# Patient Record
Sex: FEMALE | Race: WHITE | HISPANIC OR LATINO | Employment: FULL TIME | URBAN - METROPOLITAN AREA
[De-identification: names, ages, dates, MRNs, and addresses within clinical notes are randomized per-mention and may not be internally consistent; named-entity substitution may affect disease eponyms.]

---

## 2023-03-07 ENCOUNTER — TELEPHONE (OUTPATIENT)
Dept: PRIMARY CARE | Facility: CLINIC | Age: 38
End: 2023-03-07
Payer: COMMERCIAL

## 2023-03-08 DIAGNOSIS — F90.0 ATTENTION DEFICIT HYPERACTIVITY DISORDER (ADHD), PREDOMINANTLY INATTENTIVE TYPE: Primary | ICD-10-CM

## 2023-03-08 RX ORDER — DEXTROAMPHETAMINE SACCHARATE, AMPHETAMINE ASPARTATE, DEXTROAMPHETAMINE SULFATE AND AMPHETAMINE SULFATE 5; 5; 5; 5 MG/1; MG/1; MG/1; MG/1
20 TABLET ORAL 3 TIMES DAILY
Qty: 90 TABLET | Refills: 0 | Status: SHIPPED | OUTPATIENT
Start: 2023-03-08 | End: 2023-04-11 | Stop reason: SDUPTHER

## 2023-04-11 DIAGNOSIS — F90.0 ATTENTION DEFICIT HYPERACTIVITY DISORDER (ADHD), PREDOMINANTLY INATTENTIVE TYPE: ICD-10-CM

## 2023-04-11 RX ORDER — DEXTROAMPHETAMINE SACCHARATE, AMPHETAMINE ASPARTATE, DEXTROAMPHETAMINE SULFATE AND AMPHETAMINE SULFATE 5; 5; 5; 5 MG/1; MG/1; MG/1; MG/1
20 TABLET ORAL 3 TIMES DAILY
Qty: 90 TABLET | Refills: 0 | Status: SHIPPED | OUTPATIENT
Start: 2023-04-11 | End: 2023-05-11 | Stop reason: SDUPTHER

## 2023-04-21 PROBLEM — R05.9 COUGH: Status: ACTIVE | Noted: 2023-04-21

## 2023-04-21 PROBLEM — R39.9 UTI SYMPTOMS: Status: ACTIVE | Noted: 2023-04-21

## 2023-04-21 PROBLEM — M54.50 LUMBAR PAIN: Status: ACTIVE | Noted: 2023-04-21

## 2023-04-21 PROBLEM — F90.9 ADHD (ATTENTION DEFICIT HYPERACTIVITY DISORDER): Status: ACTIVE | Noted: 2023-04-21

## 2023-04-21 RX ORDER — IBUPROFEN 600 MG/1
600 TABLET ORAL EVERY 6 HOURS
COMMUNITY
Start: 2023-01-01

## 2023-04-21 RX ORDER — ACETAMINOPHEN 325 MG/1
325 TABLET ORAL EVERY 6 HOURS
COMMUNITY
Start: 2023-01-01

## 2023-04-21 RX ORDER — NORETHINDRONE 0.35 MG/1
1 TABLET ORAL DAILY
COMMUNITY
Start: 2023-01-26

## 2023-04-21 RX ORDER — FLUTICASONE PROPIONATE 50 MCG
2 SPRAY, SUSPENSION (ML) NASAL DAILY
COMMUNITY
Start: 2022-05-31 | End: 2023-06-08 | Stop reason: SDUPTHER

## 2023-04-21 RX ORDER — METAXALONE 800 MG/1
800 TABLET ORAL NIGHTLY
COMMUNITY
Start: 2019-10-31 | End: 2023-04-24 | Stop reason: SDUPTHER

## 2023-04-24 ENCOUNTER — OFFICE VISIT (OUTPATIENT)
Dept: PRIMARY CARE | Facility: CLINIC | Age: 38
End: 2023-04-24
Payer: COMMERCIAL

## 2023-04-24 VITALS
BODY MASS INDEX: 31.08 KG/M2 | HEART RATE: 65 BPM | HEIGHT: 67 IN | OXYGEN SATURATION: 95 % | DIASTOLIC BLOOD PRESSURE: 78 MMHG | WEIGHT: 198 LBS | SYSTOLIC BLOOD PRESSURE: 130 MMHG | TEMPERATURE: 97.2 F

## 2023-04-24 DIAGNOSIS — M54.50 LUMBAR PAIN: ICD-10-CM

## 2023-04-24 DIAGNOSIS — F90.0 ATTENTION DEFICIT HYPERACTIVITY DISORDER (ADHD), PREDOMINANTLY INATTENTIVE TYPE: ICD-10-CM

## 2023-04-24 DIAGNOSIS — Z00.00 WELLNESS EXAMINATION: Primary | ICD-10-CM

## 2023-04-24 PROCEDURE — 99213 OFFICE O/P EST LOW 20 MIN: CPT | Performed by: FAMILY MEDICINE

## 2023-04-24 PROCEDURE — 1036F TOBACCO NON-USER: CPT | Performed by: FAMILY MEDICINE

## 2023-04-24 PROCEDURE — 99395 PREV VISIT EST AGE 18-39: CPT | Performed by: FAMILY MEDICINE

## 2023-04-24 RX ORDER — METAXALONE 800 MG/1
800 TABLET ORAL NIGHTLY
Qty: 90 TABLET | Refills: 1 | Status: SHIPPED | OUTPATIENT
Start: 2023-04-24

## 2023-04-24 ASSESSMENT — ENCOUNTER SYMPTOMS
LOSS OF SENSATION IN FEET: 0
OCCASIONAL FEELINGS OF UNSTEADINESS: 0
DEPRESSION: 0

## 2023-04-24 ASSESSMENT — PATIENT HEALTH QUESTIONNAIRE - PHQ9
1. LITTLE INTEREST OR PLEASURE IN DOING THINGS: NOT AT ALL
1. LITTLE INTEREST OR PLEASURE IN DOING THINGS: NOT AT ALL
SUM OF ALL RESPONSES TO PHQ9 QUESTIONS 1 AND 2: 0
2. FEELING DOWN, DEPRESSED OR HOPELESS: NOT AT ALL
SUM OF ALL RESPONSES TO PHQ9 QUESTIONS 1 AND 2: 0
2. FEELING DOWN, DEPRESSED OR HOPELESS: NOT AT ALL

## 2023-04-24 NOTE — PROGRESS NOTES
OARRS:  Maria G Persaud, DO on 4/24/2023  9:44 AM  I have personally reviewed the OARRS report for Ashley Gibson. I have considered the risks of abuse, dependence, addiction and diversion    Is the patient prescribed a combination of a benzodiazepine and opioid?  No    Last Urine Drug Screen / ordered today: No  Recent Results (from the past 40371 hour(s))   Amphetamine Confirm, Urine    Collection Time: 01/30/23 12:00 AM   Result Value Ref Range    Amphetamines,Urine <50 ng/mL    MDA, Urine <200 ng/mL    MDEA, Urine <200 ng/mL    MDMA, Urine <200 ng/mL    Methamphetamine Quant, Ur <200 ng/mL    Phentermine,Urine <200 ng/mL   Drug Screen, Urine With Reflex to Confirmation    Collection Time: 01/30/23 12:00 AM   Result Value Ref Range    DRUG SCREEN COMMENT URINE SEE BELOW     Amphetamine Screen, Urine PRESUMPTIVE NEGATIVE NEGATIVE    Barbiturate Screen, Urine PRESUMPTIVE NEGATIVE NEGATIVE    BENZODIAZEPINE (PRESENCE) IN URINE BY SCREEN METHOD PRESUMPTIVE NEGATIVE NEGATIVE    Cannabinoid Screen, Urine PRESUMPTIVE NEGATIVE NEGATIVE    Cocaine Screen, Urine PRESUMPTIVE NEGATIVE NEGATIVE    Fentanyl, Ur PRESUMPTIVE NEGATIVE NEGATIVE    Methadone Screen, Urine PRESUMPTIVE NEGATIVE NEGATIVE    Opiate Screen, Urine PRESUMPTIVE NEGATIVE NEGATIVE    Oxycodone Screen, Ur PRESUMPTIVE NEGATIVE NEGATIVE    PCP Screen, Urine PRESUMPTIVE NEGATIVE NEGATIVE     Results are as expected.     Controlled Substance Agreement:  Date of the Last Agreement: 1-30-23  Reviewed Controlled Substance Agreement including but not limited to the benefits, risks, and alternatives to treatment with a Controlled Substance medication(s).    Stimulants:   What is the patient's goal of therapy? add  Is this being achieved with current treatment? yes    Activities of Daily Living:   Is your overall impression that this patient is benefiting (symptom reduction outweighs side effects) from stimulant therapy? Yes     1. Physical Functioning: Same  2.  "Family Relationship: Same  3. Social Relationship: Same  4. Mood: Same  5. Sleep Patterns: Same  6. Overall Function: Same  Subjective   Patient ID: Ashley Gibson is a 37 y.o. female who presents for Annual Exam (YEARLY PHYSICAL).    HPI     Review of Systems   All other systems reviewed and are negative.      Objective   /78   Pulse 65   Temp 36.2 °C (97.2 °F)   Ht 1.702 m (5' 7\")   Wt 89.8 kg (198 lb)   SpO2 95%   BMI 31.01 kg/m²     Physical Exam  Constitutional:       Appearance: Normal appearance.   HENT:      Head: Normocephalic and atraumatic.      Right Ear: Tympanic membrane, ear canal and external ear normal.      Left Ear: Tympanic membrane, ear canal and external ear normal.      Nose: Nose normal.      Mouth/Throat:      Mouth: Mucous membranes are moist.      Pharynx: Oropharynx is clear.   Eyes:      Extraocular Movements: Extraocular movements intact.      Conjunctiva/sclera: Conjunctivae normal.      Pupils: Pupils are equal, round, and reactive to light.   Cardiovascular:      Rate and Rhythm: Normal rate and regular rhythm.      Pulses: Normal pulses.      Heart sounds: Normal heart sounds.   Pulmonary:      Effort: Pulmonary effort is normal.      Breath sounds: Normal breath sounds.   Abdominal:      General: Abdomen is flat. Bowel sounds are normal.      Palpations: Abdomen is soft.   Musculoskeletal:         General: Normal range of motion.      Cervical back: Normal range of motion and neck supple.   Skin:     General: Skin is warm and dry.      Capillary Refill: Capillary refill takes less than 2 seconds.   Neurological:      General: No focal deficit present.      Mental Status: She is alert and oriented to person, place, and time.   Psychiatric:         Mood and Affect: Mood normal.         Behavior: Behavior normal.         Thought Content: Thought content normal.     Assessment/Plan   Diagnoses and all orders for this visit:  Wellness examination  Lumbar pain  -     " metaxalone (Skelaxin) 800 mg tablet; Take 1 tablet (800 mg) by mouth once daily at bedtime.  Attention deficit hyperactivity disorder (ADHD), predominantly inattentive type

## 2023-05-11 DIAGNOSIS — F90.0 ATTENTION DEFICIT HYPERACTIVITY DISORDER (ADHD), PREDOMINANTLY INATTENTIVE TYPE: ICD-10-CM

## 2023-05-11 RX ORDER — DEXTROAMPHETAMINE SACCHARATE, AMPHETAMINE ASPARTATE, DEXTROAMPHETAMINE SULFATE AND AMPHETAMINE SULFATE 5; 5; 5; 5 MG/1; MG/1; MG/1; MG/1
20 TABLET ORAL 3 TIMES DAILY
Qty: 90 TABLET | Refills: 0 | Status: SHIPPED | OUTPATIENT
Start: 2023-05-11 | End: 2023-06-08 | Stop reason: SDUPTHER

## 2023-05-11 NOTE — TELEPHONE ENCOUNTER
PT CALLED REQUESTING A REFILL OF HER ADDRALL TO [facility] IN [place]. HER LAST APPT WAS 4.24.23 AND NEXT IS 7.24.23

## 2023-06-08 DIAGNOSIS — R05.2 SUBACUTE COUGH: Primary | ICD-10-CM

## 2023-06-08 DIAGNOSIS — F90.0 ATTENTION DEFICIT HYPERACTIVITY DISORDER (ADHD), PREDOMINANTLY INATTENTIVE TYPE: ICD-10-CM

## 2023-06-08 RX ORDER — DEXTROAMPHETAMINE SACCHARATE, AMPHETAMINE ASPARTATE, DEXTROAMPHETAMINE SULFATE AND AMPHETAMINE SULFATE 5; 5; 5; 5 MG/1; MG/1; MG/1; MG/1
20 TABLET ORAL 3 TIMES DAILY
Qty: 90 TABLET | Refills: 0 | Status: SHIPPED | OUTPATIENT
Start: 2023-06-08 | End: 2023-07-12 | Stop reason: SDUPTHER

## 2023-06-08 RX ORDER — FLUTICASONE PROPIONATE 50 MCG
2 SPRAY, SUSPENSION (ML) NASAL DAILY
Qty: 16 G | Refills: 0 | Status: SHIPPED | OUTPATIENT
Start: 2023-06-08 | End: 2023-07-12 | Stop reason: SDUPTHER

## 2023-06-08 NOTE — TELEPHONE ENCOUNTER
PT CALLED REQUESTING A REFILL OF HER ADDERALL AND FLONASE TO THE [facility] IN [place] PLEASE. LAST APPT WAS 4.24.23 AND NEXIT IS 7.24.23

## 2023-07-12 DIAGNOSIS — F90.0 ATTENTION DEFICIT HYPERACTIVITY DISORDER (ADHD), PREDOMINANTLY INATTENTIVE TYPE: ICD-10-CM

## 2023-07-12 DIAGNOSIS — R05.2 SUBACUTE COUGH: ICD-10-CM

## 2023-07-12 RX ORDER — DEXTROAMPHETAMINE SACCHARATE, AMPHETAMINE ASPARTATE, DEXTROAMPHETAMINE SULFATE AND AMPHETAMINE SULFATE 5; 5; 5; 5 MG/1; MG/1; MG/1; MG/1
20 TABLET ORAL 3 TIMES DAILY
Qty: 90 TABLET | Refills: 0 | Status: SHIPPED | OUTPATIENT
Start: 2023-07-12 | End: 2023-08-10 | Stop reason: SDUPTHER

## 2023-07-12 RX ORDER — FLUTICASONE PROPIONATE 50 MCG
2 SPRAY, SUSPENSION (ML) NASAL DAILY
Qty: 16 G | Refills: 0 | Status: SHIPPED | OUTPATIENT
Start: 2023-07-12

## 2023-07-12 NOTE — TELEPHONE ENCOUNTER
PT REQUESTING A REFILL OF HER ADDERALL AND FLONASE TO THE [facility] IN [place]. LAST APPT WAS 4.24.23 AND NEXT IS 7.24.23

## 2023-07-24 ENCOUNTER — TELEMEDICINE (OUTPATIENT)
Dept: PRIMARY CARE | Facility: CLINIC | Age: 38
End: 2023-07-24
Payer: COMMERCIAL

## 2023-07-24 DIAGNOSIS — F90.0 ATTENTION DEFICIT HYPERACTIVITY DISORDER (ADHD), PREDOMINANTLY INATTENTIVE TYPE: Primary | ICD-10-CM

## 2023-07-24 PROCEDURE — 99213 OFFICE O/P EST LOW 20 MIN: CPT | Performed by: FAMILY MEDICINE

## 2023-07-24 ASSESSMENT — ENCOUNTER SYMPTOMS
LOSS OF SENSATION IN FEET: 0
DEPRESSION: 0
OCCASIONAL FEELINGS OF UNSTEADINESS: 0

## 2023-07-24 ASSESSMENT — PATIENT HEALTH QUESTIONNAIRE - PHQ9
SUM OF ALL RESPONSES TO PHQ9 QUESTIONS 1 AND 2: 0
2. FEELING DOWN, DEPRESSED OR HOPELESS: NOT AT ALL
1. LITTLE INTEREST OR PLEASURE IN DOING THINGS: NOT AT ALL

## 2023-07-24 NOTE — PROGRESS NOTES
OARRS:  Maria G Persaud, DO on 7/24/2023  9:42 AM  I have personally reviewed the OARRS report for Ashley Gibson. I have considered the risks of abuse, dependence, addiction and diversion    Is the patient prescribed a combination of a benzodiazepine and opioid?  No    Last Urine Drug Screen / ordered today: No  Recent Results (from the past 63645 hour(s))   Amphetamine Confirm, Urine    Collection Time: 01/30/23 12:00 AM   Result Value Ref Range    Amphetamines,Urine <50 ng/mL    MDA, Urine <200 ng/mL    MDEA, Urine <200 ng/mL    MDMA, Urine <200 ng/mL    Methamphetamine Quant, Ur <200 ng/mL    Phentermine,Urine <200 ng/mL   Drug Screen, Urine With Reflex to Confirmation    Collection Time: 01/30/23 12:00 AM   Result Value Ref Range    DRUG SCREEN COMMENT URINE SEE BELOW     Amphetamine Screen, Urine PRESUMPTIVE NEGATIVE NEGATIVE    Barbiturate Screen, Urine PRESUMPTIVE NEGATIVE NEGATIVE    BENZODIAZEPINE (PRESENCE) IN URINE BY SCREEN METHOD PRESUMPTIVE NEGATIVE NEGATIVE    Cannabinoid Screen, Urine PRESUMPTIVE NEGATIVE NEGATIVE    Cocaine Screen, Urine PRESUMPTIVE NEGATIVE NEGATIVE    Fentanyl, Ur PRESUMPTIVE NEGATIVE NEGATIVE    Methadone Screen, Urine PRESUMPTIVE NEGATIVE NEGATIVE    Opiate Screen, Urine PRESUMPTIVE NEGATIVE NEGATIVE    Oxycodone Screen, Ur PRESUMPTIVE NEGATIVE NEGATIVE    PCP Screen, Urine PRESUMPTIVE NEGATIVE NEGATIVE     Results are as expected.     Controlled Substance Agreement:  Date of the Last Agreement: 1-30-23  Reviewed Controlled Substance Agreement including but not limited to the benefits, risks, and alternatives to treatment with a Controlled Substance medication(s).    Stimulants:   What is the patient's goal of therapy? add  Is this being achieved with current treatment? yes    Activities of Daily Living:   Is your overall impression that this patient is benefiting (symptom reduction outweighs side effects) from stimulant therapy? Yes     1. Physical Functioning: Same  2.  Family Relationship: Same  3. Social Relationship: Same  4. Mood: Same  5. Sleep Patterns: Same  6. Overall Function: Same  Subjective   Patient ID: Ashley Gibson is a 37 y.o. female who presents for VIRTUAL (VIRTUAL).  Chief Complaint_UH:  An interactive audio and video telecommunication system which permits real time communications between the patient (at the originating site) and provider (at the distant site) was utilized to provide this telehealth service.   Virtual or Telephone Consent    An interactive audio and video telecommunication system which permits real time communications between the patient (at the originating site) and provider (at the distant site) was utilized to provide this telehealth service.   Verbal consent was requested and obtained from Ashley Gibson on this date, 07/24/23 for a telehealth visit.    HPI   Pt is doing well   On meds no side effects  Review of Systems   All other systems reviewed and are negative.      Objective   There were no vitals taken for this visit.    Physical Exam  Constitutional:       Appearance: Normal appearance.   HENT:      Head: Normocephalic.      Nose: Nose normal.   Pulmonary:      Effort: Pulmonary effort is normal.   Neurological:      Mental Status: She is alert and oriented to person, place, and time.   Psychiatric:         Mood and Affect: Mood normal.         Behavior: Behavior normal.         Assessment/Plan   Diagnoses and all orders for this visit:  Attention deficit hyperactivity disorder (ADHD), predominantly inattentive type

## 2023-08-10 DIAGNOSIS — F90.0 ATTENTION DEFICIT HYPERACTIVITY DISORDER (ADHD), PREDOMINANTLY INATTENTIVE TYPE: ICD-10-CM

## 2023-08-10 RX ORDER — DEXTROAMPHETAMINE SACCHARATE, AMPHETAMINE ASPARTATE, DEXTROAMPHETAMINE SULFATE AND AMPHETAMINE SULFATE 5; 5; 5; 5 MG/1; MG/1; MG/1; MG/1
20 TABLET ORAL 3 TIMES DAILY
Qty: 90 TABLET | Refills: 0 | Status: SHIPPED | OUTPATIENT
Start: 2023-08-10 | End: 2023-08-15 | Stop reason: SDUPTHER

## 2023-08-10 NOTE — TELEPHONE ENCOUNTER
Pt rq a refill on adderall 20 mg tablet. Her pharmacy is the [facility] in [place] on file.

## 2023-08-15 DIAGNOSIS — F90.0 ATTENTION DEFICIT HYPERACTIVITY DISORDER (ADHD), PREDOMINANTLY INATTENTIVE TYPE: ICD-10-CM

## 2023-08-15 RX ORDER — DEXTROAMPHETAMINE SACCHARATE, AMPHETAMINE ASPARTATE, DEXTROAMPHETAMINE SULFATE AND AMPHETAMINE SULFATE 5; 5; 5; 5 MG/1; MG/1; MG/1; MG/1
20 TABLET ORAL 3 TIMES DAILY
Qty: 90 TABLET | Refills: 0 | Status: SHIPPED | OUTPATIENT
Start: 2023-08-15 | End: 2023-09-12 | Stop reason: SDUPTHER

## 2023-08-15 NOTE — TELEPHONE ENCOUNTER
PT CALLED REQUESTING A REFILL OF AD[facility] TO THE [facility] IN [place]. LAST APPT WAS 7.24.23 AND NEXT IS 10.23.23

## 2023-09-12 DIAGNOSIS — F90.0 ATTENTION DEFICIT HYPERACTIVITY DISORDER (ADHD), PREDOMINANTLY INATTENTIVE TYPE: ICD-10-CM

## 2023-09-12 RX ORDER — DEXTROAMPHETAMINE SACCHARATE, AMPHETAMINE ASPARTATE, DEXTROAMPHETAMINE SULFATE AND AMPHETAMINE SULFATE 5; 5; 5; 5 MG/1; MG/1; MG/1; MG/1
20 TABLET ORAL 3 TIMES DAILY
Qty: 90 TABLET | Refills: 0 | Status: SHIPPED | OUTPATIENT
Start: 2023-09-12 | End: 2023-10-12 | Stop reason: SDUPTHER

## 2023-09-12 NOTE — TELEPHONE ENCOUNTER
PT CALLED REQUESTING A REFILL OF HER ADDERALL TO THE [facility] IN [place]. LAST APPT WAS 7.24.23 AND NEXT IS 10.23.23

## 2023-10-12 ENCOUNTER — TELEPHONE (OUTPATIENT)
Dept: PRIMARY CARE | Facility: CLINIC | Age: 38
End: 2023-10-12
Payer: COMMERCIAL

## 2023-10-12 DIAGNOSIS — F90.0 ATTENTION DEFICIT HYPERACTIVITY DISORDER (ADHD), PREDOMINANTLY INATTENTIVE TYPE: ICD-10-CM

## 2023-10-12 RX ORDER — DEXTROAMPHETAMINE SACCHARATE, AMPHETAMINE ASPARTATE, DEXTROAMPHETAMINE SULFATE AND AMPHETAMINE SULFATE 5; 5; 5; 5 MG/1; MG/1; MG/1; MG/1
20 TABLET ORAL 3 TIMES DAILY
Qty: 90 TABLET | Refills: 0 | Status: SHIPPED | OUTPATIENT
Start: 2023-10-12 | End: 2023-11-13 | Stop reason: SDUPTHER

## 2023-10-23 ENCOUNTER — TELEMEDICINE (OUTPATIENT)
Dept: PRIMARY CARE | Facility: CLINIC | Age: 38
End: 2023-10-23
Payer: COMMERCIAL

## 2023-10-23 DIAGNOSIS — F90.0 ATTENTION DEFICIT HYPERACTIVITY DISORDER (ADHD), PREDOMINANTLY INATTENTIVE TYPE: Primary | ICD-10-CM

## 2023-10-23 PROBLEM — R39.9 UTI SYMPTOMS: Status: RESOLVED | Noted: 2023-04-21 | Resolved: 2023-10-23

## 2023-10-23 PROBLEM — R05.9 COUGH: Status: RESOLVED | Noted: 2023-04-21 | Resolved: 2023-10-23

## 2023-10-23 PROCEDURE — 99213 OFFICE O/P EST LOW 20 MIN: CPT | Performed by: FAMILY MEDICINE

## 2023-10-23 ASSESSMENT — PATIENT HEALTH QUESTIONNAIRE - PHQ9
1. LITTLE INTEREST OR PLEASURE IN DOING THINGS: NOT AT ALL
SUM OF ALL RESPONSES TO PHQ9 QUESTIONS 1 AND 2: 0
2. FEELING DOWN, DEPRESSED OR HOPELESS: NOT AT ALL

## 2023-10-23 NOTE — PROGRESS NOTES
OARRS:  Maria G Persaud, DO on 10/23/2023  3:26 PM  I have personally reviewed the OARRS report for Ashley Gibson. I have considered the risks of abuse, dependence, addiction and diversion    Is the patient prescribed a combination of a benzodiazepine and opioid?  No    Last Urine Drug Screen / ordered today: No  Recent Results (from the past 8760 hour(s))   Amphetamine Confirm, Urine    Collection Time: 01/30/23 12:00 AM   Result Value Ref Range    Amphetamines,Urine <50 ng/mL    MDA, Urine <200 ng/mL    MDEA, Urine <200 ng/mL    MDMA, Urine <200 ng/mL    Methamphetamine Quant, Ur <200 ng/mL    Phentermine,Urine <200 ng/mL   Drug Screen, Urine With Reflex to Confirmation    Collection Time: 01/30/23 12:00 AM   Result Value Ref Range    DRUG SCREEN COMMENT URINE SEE BELOW     Amphetamine Screen, Urine PRESUMPTIVE NEGATIVE NEGATIVE    Barbiturate Screen, Urine PRESUMPTIVE NEGATIVE NEGATIVE    BENZODIAZEPINE (PRESENCE) IN URINE BY SCREEN METHOD PRESUMPTIVE NEGATIVE NEGATIVE    Cannabinoid Screen, Urine PRESUMPTIVE NEGATIVE NEGATIVE    Cocaine Screen, Urine PRESUMPTIVE NEGATIVE NEGATIVE    Fentanyl, Ur PRESUMPTIVE NEGATIVE NEGATIVE    Methadone Screen, Urine PRESUMPTIVE NEGATIVE NEGATIVE    Opiate Screen, Urine PRESUMPTIVE NEGATIVE NEGATIVE    Oxycodone Screen, Ur PRESUMPTIVE NEGATIVE NEGATIVE    PCP Screen, Urine PRESUMPTIVE NEGATIVE NEGATIVE     Results are as expected.         Controlled Substance Agreement:  Date of the Last Agreement: 1-30-23  Reviewed Controlled Substance Agreement including but not limited to the benefits, risks, and alternatives to treatment with a Controlled Substance medication(s).    Stimulants:   What is the patient's goal of therapy? add  Is this being achieved with current treatment? yes    Activities of Daily Living:   Is your overall impression that this patient is benefiting (symptom reduction outweighs side effects) from stimulant therapy? Yes     1. Physical Functioning: Same  2.  Family Relationship: Same  3. Social Relationship: Same  4. Mood: Same  5. Sleep Patterns: Same  6. Overall Function: Same  Subjective   Patient ID: Ashley Gibson is a 37 y.o. female who presents for VIRTUAL.  Chief Complaint_UH:  An interactive audio and video telecommunication system which permits real time communications between the patient (at the originating site) and provider (at the distant site) was utilized to provide this telehealth service.   Virtual or Telephone Consent    An interactive audio and video telecommunication system which permits real time communications between the patient (at the originating site) and provider (at the distant site) was utilized to provide this telehealth service.   Verbal consent was requested and obtained from Ashley Gibson on this date, 10/23/23 for a telehealth visit.    HPI     Review of Systems   All other systems reviewed and are negative.  Pt is doing well  No side effects from meds    Objective   There were no vitals taken for this visit.    Physical Exam  Constitutional:       Appearance: Normal appearance.   HENT:      Head: Normocephalic.   Pulmonary:      Effort: Pulmonary effort is normal.   Neurological:      General: No focal deficit present.      Mental Status: She is alert and oriented to person, place, and time.   Psychiatric:         Mood and Affect: Mood normal.         Behavior: Behavior normal.         Assessment/Plan   Diagnoses and all orders for this visit:  Attention deficit hyperactivity disorder (ADHD), predominantly inattentive type

## 2023-11-13 DIAGNOSIS — F90.0 ATTENTION DEFICIT HYPERACTIVITY DISORDER (ADHD), PREDOMINANTLY INATTENTIVE TYPE: ICD-10-CM

## 2023-11-13 RX ORDER — DEXTROAMPHETAMINE SACCHARATE, AMPHETAMINE ASPARTATE, DEXTROAMPHETAMINE SULFATE AND AMPHETAMINE SULFATE 5; 5; 5; 5 MG/1; MG/1; MG/1; MG/1
20 TABLET ORAL 3 TIMES DAILY
Qty: 90 TABLET | Refills: 0 | Status: SHIPPED | OUTPATIENT
Start: 2023-11-13 | End: 2023-12-13 | Stop reason: SDUPTHER

## 2023-12-13 DIAGNOSIS — F90.0 ATTENTION DEFICIT HYPERACTIVITY DISORDER (ADHD), PREDOMINANTLY INATTENTIVE TYPE: ICD-10-CM

## 2023-12-13 RX ORDER — DEXTROAMPHETAMINE SACCHARATE, AMPHETAMINE ASPARTATE, DEXTROAMPHETAMINE SULFATE AND AMPHETAMINE SULFATE 5; 5; 5; 5 MG/1; MG/1; MG/1; MG/1
20 TABLET ORAL 3 TIMES DAILY
Qty: 90 TABLET | Refills: 0 | Status: SHIPPED | OUTPATIENT
Start: 2023-12-13 | End: 2024-01-08 | Stop reason: SDUPTHER

## 2023-12-13 NOTE — TELEPHONE ENCOUNTER
PT CALLED REQUESTING A REFILL OF ADDERALL 60MG TO [facility] [place]. NEXT APPT IS 1.8.24

## 2024-01-08 ENCOUNTER — OFFICE VISIT (OUTPATIENT)
Dept: PRIMARY CARE | Facility: CLINIC | Age: 39
End: 2024-01-08
Payer: COMMERCIAL

## 2024-01-08 VITALS
TEMPERATURE: 97.8 F | DIASTOLIC BLOOD PRESSURE: 78 MMHG | HEART RATE: 98 BPM | SYSTOLIC BLOOD PRESSURE: 130 MMHG | WEIGHT: 185 LBS | BODY MASS INDEX: 29.73 KG/M2 | HEIGHT: 66 IN | OXYGEN SATURATION: 99 %

## 2024-01-08 DIAGNOSIS — R53.83 OTHER FATIGUE: Primary | ICD-10-CM

## 2024-01-08 DIAGNOSIS — F90.0 ATTENTION DEFICIT HYPERACTIVITY DISORDER (ADHD), PREDOMINANTLY INATTENTIVE TYPE: ICD-10-CM

## 2024-01-08 DIAGNOSIS — Z79.899 MEDICATION MANAGEMENT: ICD-10-CM

## 2024-01-08 LAB
AMPHETAMINES UR QL SCN: ABNORMAL
BARBITURATES UR QL SCN: ABNORMAL
BENZODIAZ UR QL SCN: ABNORMAL
BZE UR QL SCN: ABNORMAL
CANNABINOIDS UR QL SCN: ABNORMAL
FENTANYL+NORFENTANYL UR QL SCN: ABNORMAL
OPIATES UR QL SCN: ABNORMAL
OXYCODONE+OXYMORPHONE UR QL SCN: ABNORMAL
PCP UR QL SCN: ABNORMAL

## 2024-01-08 PROCEDURE — 80307 DRUG TEST PRSMV CHEM ANLYZR: CPT

## 2024-01-08 PROCEDURE — 99213 OFFICE O/P EST LOW 20 MIN: CPT | Performed by: FAMILY MEDICINE

## 2024-01-08 PROCEDURE — 1036F TOBACCO NON-USER: CPT | Performed by: FAMILY MEDICINE

## 2024-01-08 PROCEDURE — 80324 DRUG SCREEN AMPHETAMINES 1/2: CPT

## 2024-01-08 RX ORDER — DEXTROAMPHETAMINE SACCHARATE, AMPHETAMINE ASPARTATE, DEXTROAMPHETAMINE SULFATE AND AMPHETAMINE SULFATE 5; 5; 5; 5 MG/1; MG/1; MG/1; MG/1
20 TABLET ORAL 3 TIMES DAILY
Qty: 90 TABLET | Refills: 0 | Status: SHIPPED | OUTPATIENT
Start: 2024-01-08 | End: 2024-02-13 | Stop reason: SDUPTHER

## 2024-01-08 ASSESSMENT — ENCOUNTER SYMPTOMS
LOSS OF SENSATION IN FEET: 0
DEPRESSION: 0
OCCASIONAL FEELINGS OF UNSTEADINESS: 0

## 2024-01-08 ASSESSMENT — LIFESTYLE VARIABLES
HOW OFTEN DO YOU HAVE SIX OR MORE DRINKS ON ONE OCCASION: NEVER
SKIP TO QUESTIONS 9-10: 1
HOW OFTEN DO YOU HAVE A DRINK CONTAINING ALCOHOL: NEVER
AUDIT-C TOTAL SCORE: 0
HOW MANY STANDARD DRINKS CONTAINING ALCOHOL DO YOU HAVE ON A TYPICAL DAY: PATIENT DOES NOT DRINK

## 2024-01-08 ASSESSMENT — PATIENT HEALTH QUESTIONNAIRE - PHQ9
1. LITTLE INTEREST OR PLEASURE IN DOING THINGS: NOT AT ALL
2. FEELING DOWN, DEPRESSED OR HOPELESS: NOT AT ALL
SUM OF ALL RESPONSES TO PHQ9 QUESTIONS 1 AND 2: 0

## 2024-01-08 NOTE — PROGRESS NOTES
OARRS:  Maria G Persaud, DO on 1/8/2024  2:25 PM  I have personally reviewed the OARRS report for Ashley Gibson. I have considered the risks of abuse, dependence, addiction and diversion    Is the patient prescribed a combination of a benzodiazepine and opioid?  No    Last Urine Drug Screen / ordered today: Yes  Recent Results (from the past 8760 hour(s))   Amphetamine Confirm, Urine    Collection Time: 01/30/23 12:00 AM   Result Value Ref Range    Amphetamines,Urine <50 ng/mL    MDA, Urine <200 ng/mL    MDEA, Urine <200 ng/mL    MDMA, Urine <200 ng/mL    Methamphetamine Quant, Ur <200 ng/mL    Phentermine,Urine <200 ng/mL   Drug Screen, Urine With Reflex to Confirmation    Collection Time: 01/30/23 12:00 AM   Result Value Ref Range    DRUG SCREEN COMMENT URINE SEE BELOW     Amphetamine Screen, Urine PRESUMPTIVE NEGATIVE NEGATIVE    Barbiturate Screen, Urine PRESUMPTIVE NEGATIVE NEGATIVE    BENZODIAZEPINE (PRESENCE) IN URINE BY SCREEN METHOD PRESUMPTIVE NEGATIVE NEGATIVE    Cannabinoid Screen, Urine PRESUMPTIVE NEGATIVE NEGATIVE    Cocaine Screen, Urine PRESUMPTIVE NEGATIVE NEGATIVE    Fentanyl, Ur PRESUMPTIVE NEGATIVE NEGATIVE    Methadone Screen, Urine PRESUMPTIVE NEGATIVE NEGATIVE    Opiate Screen, Urine PRESUMPTIVE NEGATIVE NEGATIVE    Oxycodone Screen, Ur PRESUMPTIVE NEGATIVE NEGATIVE    PCP Screen, Urine PRESUMPTIVE NEGATIVE NEGATIVE     Results are as expected.         Controlled Substance Agreement:  Date of the Last Agreement: 1-8-24  Reviewed Controlled Substance Agreement including but not limited to the benefits, risks, and alternatives to treatment with a Controlled Substance medication(s).    Stimulants:   What is the patient's goal of therapy? add  Is this being achieved with current treatment? yes    Activities of Daily Living:   Is your overall impression that this patient is benefiting (symptom reduction outweighs side effects) from stimulant therapy? Yes     1. Physical Functioning: Same  2.  "Family Relationship: Same  3. Social Relationship: Same  4. Mood: Same  5. Sleep Patterns: Same  6. Overall Function: Same  Subjective   Patient ID: Ashley Gibson is a 38 y.o. female who presents for Annual Exam.    HPI   Add is stable  Pt denies cp, sob  Some fatigue  Review of Systems   All other systems reviewed and are negative.      Objective   /78   Pulse 98   Temp 36.6 °C (97.8 °F)   Ht 1.676 m (5' 6\")   Wt 83.9 kg (185 lb)   SpO2 99%   BMI 29.86 kg/m²     Physical Exam  Constitutional:       Appearance: Normal appearance.   HENT:      Head: Normocephalic and atraumatic.      Right Ear: Tympanic membrane, ear canal and external ear normal.      Left Ear: Tympanic membrane, ear canal and external ear normal.      Nose: Nose normal.      Mouth/Throat:      Mouth: Mucous membranes are moist.      Pharynx: Oropharynx is clear.   Eyes:      Extraocular Movements: Extraocular movements intact.      Conjunctiva/sclera: Conjunctivae normal.      Pupils: Pupils are equal, round, and reactive to light.   Cardiovascular:      Rate and Rhythm: Normal rate and regular rhythm.      Pulses: Normal pulses.      Heart sounds: Normal heart sounds.   Pulmonary:      Effort: Pulmonary effort is normal.      Breath sounds: Normal breath sounds.   Abdominal:      General: Abdomen is flat. Bowel sounds are normal.      Palpations: Abdomen is soft.   Genitourinary:     Comments: nl  Musculoskeletal:         General: Normal range of motion.      Cervical back: Normal range of motion and neck supple.   Skin:     General: Skin is warm and dry.      Capillary Refill: Capillary refill takes less than 2 seconds.   Neurological:      General: No focal deficit present.      Mental Status: She is alert and oriented to person, place, and time.   Psychiatric:         Mood and Affect: Mood normal.         Behavior: Behavior normal.         Thought Content: Thought content normal.         Assessment/Plan   Diagnoses and all orders " for this visit:  Other fatigue  -     TSH with reflex to Free T4 if abnormal; Future  -     Lipid Panel; Future  -     Comprehensive Metabolic Panel; Future  -     CBC and Auto Differential; Future  -     Thyroid Peroxidase (TPO) Antibody; Future  Medication management  -     Drug Screen, Urine With Reflex to Confirmation  -     Amphetamine Confirm, Urine  Attention deficit hyperactivity disorder (ADHD), predominantly inattentive type  -     amphetamine-dextroamphetamine (Adderall) 20 mg tablet; Take 1 tablet (20 mg) by mouth 3 times a day.  -     TSH with reflex to Free T4 if abnormal; Future  -     Lipid Panel; Future  -     Comprehensive Metabolic Panel; Future  -     CBC and Auto Differential; Future

## 2024-01-12 LAB
AMPHET UR-MCNC: >5000 NG/ML
MDA UR-MCNC: <200 NG/ML
MDEA UR-MCNC: <200 NG/ML
MDMA UR-MCNC: <200 NG/ML
METHAMPHET UR-MCNC: <200 NG/ML
PHENTERMINE UR CFM-MCNC: <200 NG/ML

## 2024-02-13 DIAGNOSIS — F90.0 ATTENTION DEFICIT HYPERACTIVITY DISORDER (ADHD), PREDOMINANTLY INATTENTIVE TYPE: ICD-10-CM

## 2024-02-13 RX ORDER — DEXTROAMPHETAMINE SACCHARATE, AMPHETAMINE ASPARTATE, DEXTROAMPHETAMINE SULFATE AND AMPHETAMINE SULFATE 5; 5; 5; 5 MG/1; MG/1; MG/1; MG/1
20 TABLET ORAL 3 TIMES DAILY
Qty: 90 TABLET | Refills: 0 | Status: SHIPPED | OUTPATIENT
Start: 2024-02-13 | End: 2024-03-14 | Stop reason: SDUPTHER

## 2024-03-14 DIAGNOSIS — F90.0 ATTENTION DEFICIT HYPERACTIVITY DISORDER (ADHD), PREDOMINANTLY INATTENTIVE TYPE: ICD-10-CM

## 2024-03-14 RX ORDER — DEXTROAMPHETAMINE SACCHARATE, AMPHETAMINE ASPARTATE, DEXTROAMPHETAMINE SULFATE AND AMPHETAMINE SULFATE 5; 5; 5; 5 MG/1; MG/1; MG/1; MG/1
20 TABLET ORAL 3 TIMES DAILY
Qty: 90 TABLET | Refills: 0 | OUTPATIENT
Start: 2024-03-14 | End: 2024-04-13

## 2024-03-14 RX ORDER — DEXTROAMPHETAMINE SACCHARATE, AMPHETAMINE ASPARTATE, DEXTROAMPHETAMINE SULFATE AND AMPHETAMINE SULFATE 5; 5; 5; 5 MG/1; MG/1; MG/1; MG/1
20 TABLET ORAL 3 TIMES DAILY
Qty: 90 TABLET | Refills: 0 | Status: SHIPPED | OUTPATIENT
Start: 2024-03-14 | End: 2024-04-08 | Stop reason: SDUPTHER

## 2024-03-14 NOTE — TELEPHONE ENCOUNTER
PT CALLED REQUESTING A REFILL OF HER ADDERALL TO THE [facility] IN [place]. HER NEXT APPT IS 4.8.24

## 2024-04-01 ENCOUNTER — APPOINTMENT (OUTPATIENT)
Dept: PRIMARY CARE | Facility: CLINIC | Age: 39
End: 2024-04-01
Payer: COMMERCIAL

## 2024-04-08 ENCOUNTER — TELEMEDICINE (OUTPATIENT)
Dept: PRIMARY CARE | Facility: CLINIC | Age: 39
End: 2024-04-08
Payer: COMMERCIAL

## 2024-04-08 DIAGNOSIS — F90.0 ATTENTION DEFICIT HYPERACTIVITY DISORDER (ADHD), PREDOMINANTLY INATTENTIVE TYPE: ICD-10-CM

## 2024-04-08 PROCEDURE — 99213 OFFICE O/P EST LOW 20 MIN: CPT | Performed by: FAMILY MEDICINE

## 2024-04-08 PROCEDURE — 1036F TOBACCO NON-USER: CPT | Performed by: FAMILY MEDICINE

## 2024-04-08 RX ORDER — DEXTROAMPHETAMINE SACCHARATE, AMPHETAMINE ASPARTATE, DEXTROAMPHETAMINE SULFATE AND AMPHETAMINE SULFATE 5; 5; 5; 5 MG/1; MG/1; MG/1; MG/1
20 TABLET ORAL 3 TIMES DAILY
Qty: 90 TABLET | Refills: 0 | Status: SHIPPED | OUTPATIENT
Start: 2024-04-08 | End: 2024-05-23 | Stop reason: SDUPTHER

## 2024-04-08 ASSESSMENT — PATIENT HEALTH QUESTIONNAIRE - PHQ9
2. FEELING DOWN, DEPRESSED OR HOPELESS: NOT AT ALL
1. LITTLE INTEREST OR PLEASURE IN DOING THINGS: NOT AT ALL
SUM OF ALL RESPONSES TO PHQ9 QUESTIONS 1 AND 2: 0

## 2024-04-08 NOTE — PROGRESS NOTES
OARRS:  Maria G Persaud,  on 4/8/2024 10:51 AM  I have personally reviewed the OARRS report for Ashley Gibson. I have considered the risks of abuse, dependence, addiction and diversion  Chief Complaint_UH:  An interactive audio and video telecommunication system which permits real time communications between the patient (at the originating site) and provider (at the distant site) was utilized to provide this telehealth service.   Virtual or Telephone Consent    An interactive audio and video telecommunication system which permits real time communications between the patient (at the originating site) and provider (at the distant site) was utilized to provide this telehealth service.   Verbal consent was requested and obtained from Ashley Paige on this date, 04/08/24 for a telehealth visit.    Is the patient prescribed a combination of a benzodiazepine and opioid?  No    Last Urine Drug Screen / ordered today: No  Recent Results (from the past 8760 hour(s))   Amphetamine Confirm, Urine    Collection Time: 01/08/24  2:33 PM   Result Value Ref Range    Methamphetamine Quant, Ur <200 ng/mL    MDA, Urine <200 ng/mL    MDEA, Urine <200 ng/mL    Phentermine,Urine <200 ng/mL    Amphetamines,Urine >5000 ng/mL    MDMA, Urine <200 ng/mL   Drug Screen, Urine With Reflex to Confirmation    Collection Time: 01/08/24  2:33 PM   Result Value Ref Range    Amphetamine Screen, Urine Presumptive Positive (A) Presumptive Negative    Barbiturate Screen, Urine Presumptive Negative Presumptive Negative    Benzodiazepines Screen, Urine Presumptive Negative Presumptive Negative    Cannabinoid Screen, Urine Presumptive Negative Presumptive Negative    Cocaine Metabolite Screen, Urine Presumptive Negative Presumptive Negative    Fentanyl Screen, Urine Presumptive Negative Presumptive Negative    Opiate Screen, Urine Presumptive Negative Presumptive Negative    Oxycodone Screen, Urine Presumptive Negative Presumptive Negative    PCP  Screen, Urine Presumptive Negative Presumptive Negative     Results are as expected.         Controlled Substance Agreement:  Date of the Last Agreement: 2024  Reviewed Controlled Substance Agreement including but not limited to the benefits, risks, and alternatives to treatment with a Controlled Substance medication(s).    Stimulants:   What is the patient's goal of therapy? add  Is this being achieved with current treatment? yes    Activities of Daily Living:   Is your overall impression that this patient is benefiting (symptom reduction outweighs side effects) from stimulant therapy? Yes     1. Physical Functioning: Same  2. Family Relationship: Same  3. Social Relationship: Same  4. Mood: Same  5. Sleep Patterns: Same  6. Overall Function: Same  Subjective   Patient ID: Ashley Gibson is a 38 y.o. female who presents for No chief complaint on file..    ADHD         Review of Systems   All other systems reviewed and are negative.      Objective   There were no vitals taken for this visit.    Physical Exam  Constitutional:       Appearance: Normal appearance.   HENT:      Head: Normocephalic.   Pulmonary:      Effort: Pulmonary effort is normal.   Neurological:      General: No focal deficit present.      Mental Status: She is alert and oriented to person, place, and time.   Psychiatric:         Mood and Affect: Mood normal.         Behavior: Behavior normal.         Assessment/Plan   Diagnoses and all orders for this visit:  Attention deficit hyperactivity disorder (ADHD), predominantly inattentive type  -     amphetamine-dextroamphetamine (Adderall) 20 mg tablet; Take 1 tablet (20 mg) by mouth 3 times a day.

## 2024-05-23 DIAGNOSIS — F90.0 ATTENTION DEFICIT HYPERACTIVITY DISORDER (ADHD), PREDOMINANTLY INATTENTIVE TYPE: ICD-10-CM

## 2024-05-23 RX ORDER — DEXTROAMPHETAMINE SACCHARATE, AMPHETAMINE ASPARTATE, DEXTROAMPHETAMINE SULFATE AND AMPHETAMINE SULFATE 5; 5; 5; 5 MG/1; MG/1; MG/1; MG/1
20 TABLET ORAL 3 TIMES DAILY
Qty: 90 TABLET | Refills: 0 | Status: SHIPPED | OUTPATIENT
Start: 2024-05-23 | End: 2024-06-22

## 2024-05-23 NOTE — TELEPHONE ENCOUNTER
PT CALLED REQUESTING A REFILL OF HER ADDERALL TO THE [facility] IN [place]. HER NEXT APPT IS 7.15.24

## 2024-06-24 DIAGNOSIS — F90.0 ATTENTION DEFICIT HYPERACTIVITY DISORDER (ADHD), PREDOMINANTLY INATTENTIVE TYPE: ICD-10-CM

## 2024-06-24 RX ORDER — DEXTROAMPHETAMINE SACCHARATE, AMPHETAMINE ASPARTATE, DEXTROAMPHETAMINE SULFATE AND AMPHETAMINE SULFATE 5; 5; 5; 5 MG/1; MG/1; MG/1; MG/1
20 TABLET ORAL 3 TIMES DAILY
Qty: 90 TABLET | Refills: 0 | Status: SHIPPED | OUTPATIENT
Start: 2024-06-24 | End: 2024-07-24

## 2024-07-11 ENCOUNTER — TELEPHONE (OUTPATIENT)
Dept: PRIMARY CARE | Facility: CLINIC | Age: 39
End: 2024-07-11
Payer: COMMERCIAL

## 2024-07-11 NOTE — TELEPHONE ENCOUNTER
Patient states she has been sick since  Monday 7/8/24 with what she thinks is a sinus infection, has green drainage pain pressure, cough no fever, negative for Covid as of today, is asking if you can send something  in or  can you see her ? Please advise     [phone] please call back on this #

## 2024-07-15 ENCOUNTER — APPOINTMENT (OUTPATIENT)
Dept: PRIMARY CARE | Facility: CLINIC | Age: 39
End: 2024-07-15
Payer: COMMERCIAL

## 2024-07-16 ENCOUNTER — APPOINTMENT (OUTPATIENT)
Dept: PRIMARY CARE | Facility: CLINIC | Age: 39
End: 2024-07-16
Payer: COMMERCIAL

## 2024-07-16 DIAGNOSIS — J32.1 CHRONIC FRONTAL SINUSITIS: Primary | ICD-10-CM

## 2024-07-16 DIAGNOSIS — F90.0 ATTENTION DEFICIT HYPERACTIVITY DISORDER (ADHD), PREDOMINANTLY INATTENTIVE TYPE: ICD-10-CM

## 2024-07-16 PROCEDURE — 1036F TOBACCO NON-USER: CPT | Performed by: FAMILY MEDICINE

## 2024-07-16 PROCEDURE — 99213 OFFICE O/P EST LOW 20 MIN: CPT | Performed by: FAMILY MEDICINE

## 2024-07-16 RX ORDER — DEXTROAMPHETAMINE SACCHARATE, AMPHETAMINE ASPARTATE, DEXTROAMPHETAMINE SULFATE AND AMPHETAMINE SULFATE 5; 5; 5; 5 MG/1; MG/1; MG/1; MG/1
20 TABLET ORAL 3 TIMES DAILY
Qty: 90 TABLET | Refills: 0 | Status: SHIPPED | OUTPATIENT
Start: 2024-07-16 | End: 2024-08-15

## 2024-07-16 RX ORDER — AZITHROMYCIN 250 MG/1
TABLET, FILM COATED ORAL
Qty: 6 TABLET | Refills: 0 | Status: SHIPPED | OUTPATIENT
Start: 2024-07-16 | End: 2024-07-21

## 2024-07-16 ASSESSMENT — ENCOUNTER SYMPTOMS
LOSS OF SENSATION IN FEET: 0
OCCASIONAL FEELINGS OF UNSTEADINESS: 0
DEPRESSION: 0

## 2024-07-16 NOTE — PROGRESS NOTES
OARRS:  Maria G Persaud, DO on 7/16/2024  9:56 AM  I have personally reviewed the OARRS report for Ashley Gibson. I have considered the risks of abuse, dependence, addiction and diversion    Is the patient prescribed a combination of a benzodiazepine and opioid?  No    Last Urine Drug Screen / ordered today: No  Recent Results (from the past 8760 hour(s))   Amphetamine Confirm, Urine    Collection Time: 01/08/24  2:33 PM   Result Value Ref Range    Methamphetamine Quant, Ur <200 ng/mL    MDA, Urine <200 ng/mL    MDEA, Urine <200 ng/mL    Phentermine,Urine <200 ng/mL    Amphetamines,Urine >5000 ng/mL    MDMA, Urine <200 ng/mL   Drug Screen, Urine With Reflex to Confirmation    Collection Time: 01/08/24  2:33 PM   Result Value Ref Range    Amphetamine Screen, Urine Presumptive Positive (A) Presumptive Negative    Barbiturate Screen, Urine Presumptive Negative Presumptive Negative    Benzodiazepines Screen, Urine Presumptive Negative Presumptive Negative    Cannabinoid Screen, Urine Presumptive Negative Presumptive Negative    Cocaine Metabolite Screen, Urine Presumptive Negative Presumptive Negative    Fentanyl Screen, Urine Presumptive Negative Presumptive Negative    Opiate Screen, Urine Presumptive Negative Presumptive Negative    Oxycodone Screen, Urine Presumptive Negative Presumptive Negative    PCP Screen, Urine Presumptive Negative Presumptive Negative     Results are as expected.         Controlled Substance Agreement:  Date of the Last Agreement: 2024  Reviewed Controlled Substance Agreement including but not limited to the benefits, risks, and alternatives to treatment with a Controlled Substance medication(s).    Stimulants:   What is the patient's goal of therapy? add  Is this being achieved with current treatment? yes    Activities of Daily Living:   Is your overall impression that this patient is benefiting (symptom reduction outweighs side effects) from stimulant therapy? Yes     1. Physical  Functioning: Same  2. Family Relationship: Same  3. Social Relationship: Same  4. Mood: Same  5. Sleep Patterns: Same  6. Overall Function: Same  Subjective   Patient ID: Ashley Gibson is a 38 y.o. female who presents for Follow-up.    Pt has a sinus infection  Used OTC without relief  Pt has pressure and pain  ADD is stable  No se         Review of Systems   All other systems reviewed and are negative.      Objective   There were no vitals taken for this visit.    Physical Exam  Constitutional:       Appearance: Normal appearance.   HENT:      Head: Normocephalic.      Nose: Nose normal.   Pulmonary:      Effort: Pulmonary effort is normal.   Neurological:      General: No focal deficit present.      Mental Status: She is alert and oriented to person, place, and time.   Psychiatric:         Mood and Affect: Mood normal.         Behavior: Behavior normal.       Chief Complaint_[facility]:  An interactive audio and video telecommunication system which permits real time communications between the patient (at the originating site) and provider (at the distant site) was utilized to provide this telehealth service.   Virtual or Telephone Consent    An interactive audio and video telecommunication system which permits real time communications between the patient (at the originating site) and provider (at the distant site) was utilized to provide this telehealth service.   Verbal consent was requested and obtained from Ashley Gibson on this date, 07/16/24 for a telehealth visit.    Assessment/Plan   Diagnoses and all orders for this visit:  Chronic frontal sinusitis  -     azithromycin (Zithromax) 250 mg tablet; Take 2 tablets (500 mg) by mouth once daily for 1 day, THEN 1 tablet (250 mg) once daily for 4 days. Take 2 tabs (500 mg) by mouth today, than 1 daily for 4 days..  Attention deficit hyperactivity disorder (ADHD), predominantly inattentive type  -     amphetamine-dextroamphetamine (Adderall) 20 mg tablet; Take 1  tablet (20 mg) by mouth 3 times a day.

## 2024-08-22 DIAGNOSIS — F90.0 ATTENTION DEFICIT HYPERACTIVITY DISORDER (ADHD), PREDOMINANTLY INATTENTIVE TYPE: ICD-10-CM

## 2024-08-22 RX ORDER — DEXTROAMPHETAMINE SACCHARATE, AMPHETAMINE ASPARTATE, DEXTROAMPHETAMINE SULFATE AND AMPHETAMINE SULFATE 5; 5; 5; 5 MG/1; MG/1; MG/1; MG/1
20 TABLET ORAL 3 TIMES DAILY
Qty: 90 TABLET | Refills: 0 | Status: SHIPPED | OUTPATIENT
Start: 2024-08-22 | End: 2024-09-21

## 2024-09-24 DIAGNOSIS — F90.0 ATTENTION DEFICIT HYPERACTIVITY DISORDER (ADHD), PREDOMINANTLY INATTENTIVE TYPE: ICD-10-CM

## 2024-09-24 RX ORDER — DEXTROAMPHETAMINE SACCHARATE, AMPHETAMINE ASPARTATE, DEXTROAMPHETAMINE SULFATE AND AMPHETAMINE SULFATE 5; 5; 5; 5 MG/1; MG/1; MG/1; MG/1
20 TABLET ORAL 3 TIMES DAILY
Qty: 90 TABLET | Refills: 0 | Status: SHIPPED | OUTPATIENT
Start: 2024-09-24 | End: 2024-10-24

## 2024-10-07 ENCOUNTER — APPOINTMENT (OUTPATIENT)
Dept: PRIMARY CARE | Facility: CLINIC | Age: 39
End: 2024-10-07
Payer: COMMERCIAL

## 2024-10-09 ENCOUNTER — TELEMEDICINE (OUTPATIENT)
Dept: PRIMARY CARE | Facility: CLINIC | Age: 39
End: 2024-10-09
Payer: COMMERCIAL

## 2024-10-09 DIAGNOSIS — F90.0 ATTENTION DEFICIT HYPERACTIVITY DISORDER (ADHD), PREDOMINANTLY INATTENTIVE TYPE: Primary | ICD-10-CM

## 2024-10-09 PROCEDURE — 99213 OFFICE O/P EST LOW 20 MIN: CPT | Performed by: FAMILY MEDICINE

## 2024-10-09 PROCEDURE — 1036F TOBACCO NON-USER: CPT | Performed by: FAMILY MEDICINE

## 2024-10-09 RX ORDER — DOXYCYCLINE 100 MG/1
CAPSULE ORAL
COMMUNITY
Start: 2024-09-13

## 2024-10-09 RX ORDER — CLINDAMYCIN PHOSPHATE 10 UG/ML
LOTION TOPICAL
COMMUNITY
Start: 2024-08-20

## 2024-10-09 ASSESSMENT — ENCOUNTER SYMPTOMS
OCCASIONAL FEELINGS OF UNSTEADINESS: 0
DEPRESSION: 0
LOSS OF SENSATION IN FEET: 0

## 2024-10-09 NOTE — PROGRESS NOTES
Subjective   Patient ID: Ashley Gibson is a 38 y.o. female who presents for Follow-up (VIRTUAL MED FOLLOW UP).  Chief Complaint_[facility]:  An interactive audio and video telecommunication system which permits real time communications between the patient (at the originating site) and provider (at the distant site) was utilized to provide this telehealth service.   Virtual or Telephone Consent    An interactive audio and video telecommunication system which permits real time communications between the patient (at the originating site) and provider (at the distant site) was utilized to provide this telehealth service.   Verbal consent was requested and obtained from Ashley Gibson on this date, 10/09/24 for a telehealth visit.    HPI     Review of Systems   All other systems reviewed and are negative.      Objective   There were no vitals taken for this visit.    Physical Exam  Constitutional:       Appearance: Normal appearance.   HENT:      Head: Normocephalic.      Nose: Nose normal.   Pulmonary:      Effort: Pulmonary effort is normal.   Neurological:      General: No focal deficit present.      Mental Status: She is alert and oriented to person, place, and time.     OARRS:  Maria G Persaud DO on 10/9/2024  9:06 AM  I have personally reviewed the OARRS report for Ashley Gibson. I have considered the risks of abuse, dependence, addiction and diversion    Is the patient prescribed a combination of a benzodiazepine and opioid?  No    Last Urine Drug Screen / ordered today: No  Recent Results (from the past 8760 hour(s))   Amphetamine Confirm, Urine    Collection Time: 01/08/24  2:33 PM   Result Value Ref Range    Methamphetamine Quant, Ur <200 ng/mL    MDA, Urine <200 ng/mL    MDEA, Urine <200 ng/mL    Phentermine,Urine <200 ng/mL    Amphetamines,Urine >5000 ng/mL    MDMA, Urine <200 ng/mL   Drug Screen, Urine With Reflex to Confirmation    Collection Time: 01/08/24  2:33 PM   Result Value Ref Range    Amphetamine  Screen, Urine Presumptive Positive (A) Presumptive Negative    Barbiturate Screen, Urine Presumptive Negative Presumptive Negative    Benzodiazepines Screen, Urine Presumptive Negative Presumptive Negative    Cannabinoid Screen, Urine Presumptive Negative Presumptive Negative    Cocaine Metabolite Screen, Urine Presumptive Negative Presumptive Negative    Fentanyl Screen, Urine Presumptive Negative Presumptive Negative    Opiate Screen, Urine Presumptive Negative Presumptive Negative    Oxycodone Screen, Urine Presumptive Negative Presumptive Negative    PCP Screen, Urine Presumptive Negative Presumptive Negative     Results are as expected.         Controlled Substance Agreement:  Date of the Last Agreement: 2024  Reviewed Controlled Substance Agreement including but not limited to the benefits, risks, and alternatives to treatment with a Controlled Substance medication(s).    Stimulants:   What is the patient's goal of therapy? adhd  Is this being achieved with current treatment? yes    Activities of Daily Living:   Is your overall impression that this patient is benefiting (symptom reduction outweighs side effects) from stimulant therapy? Yes     1. Physical Functioning: Same  2. Family Relationship: Same  3. Social Relationship: Same  4. Mood: Same  5. Sleep Patterns: Same  6. Overall Function: Same      Assessment/Plan   Diagnoses and all orders for this visit:  Attention deficit hyperactivity disorder (ADHD), predominantly inattentive type    Rto 4 m   Call fo rx

## 2024-10-23 DIAGNOSIS — F90.0 ATTENTION DEFICIT HYPERACTIVITY DISORDER (ADHD), PREDOMINANTLY INATTENTIVE TYPE: ICD-10-CM

## 2024-10-23 RX ORDER — DEXTROAMPHETAMINE SACCHARATE, AMPHETAMINE ASPARTATE, DEXTROAMPHETAMINE SULFATE AND AMPHETAMINE SULFATE 5; 5; 5; 5 MG/1; MG/1; MG/1; MG/1
20 TABLET ORAL 3 TIMES DAILY
Qty: 90 TABLET | Refills: 0 | Status: SHIPPED | OUTPATIENT
Start: 2024-10-23 | End: 2024-11-22

## 2024-11-21 DIAGNOSIS — F90.0 ATTENTION DEFICIT HYPERACTIVITY DISORDER (ADHD), PREDOMINANTLY INATTENTIVE TYPE: ICD-10-CM

## 2024-11-21 RX ORDER — DEXTROAMPHETAMINE SACCHARATE, AMPHETAMINE ASPARTATE, DEXTROAMPHETAMINE SULFATE AND AMPHETAMINE SULFATE 5; 5; 5; 5 MG/1; MG/1; MG/1; MG/1
20 TABLET ORAL 3 TIMES DAILY
Qty: 90 TABLET | Refills: 0 | Status: SHIPPED | OUTPATIENT
Start: 2024-11-21 | End: 2024-12-21

## 2024-12-18 DIAGNOSIS — F90.0 ATTENTION DEFICIT HYPERACTIVITY DISORDER (ADHD), PREDOMINANTLY INATTENTIVE TYPE: ICD-10-CM

## 2024-12-18 RX ORDER — DEXTROAMPHETAMINE SACCHARATE, AMPHETAMINE ASPARTATE, DEXTROAMPHETAMINE SULFATE AND AMPHETAMINE SULFATE 5; 5; 5; 5 MG/1; MG/1; MG/1; MG/1
20 TABLET ORAL 3 TIMES DAILY
Qty: 90 TABLET | Refills: 0 | Status: SHIPPED | OUTPATIENT
Start: 2024-12-18 | End: 2025-01-17

## 2025-01-07 DIAGNOSIS — F90.0 ATTENTION DEFICIT HYPERACTIVITY DISORDER (ADHD), PREDOMINANTLY INATTENTIVE TYPE: ICD-10-CM

## 2025-01-07 RX ORDER — DEXTROAMPHETAMINE SACCHARATE, AMPHETAMINE ASPARTATE, DEXTROAMPHETAMINE SULFATE AND AMPHETAMINE SULFATE 5; 5; 5; 5 MG/1; MG/1; MG/1; MG/1
20 TABLET ORAL 3 TIMES DAILY
Qty: 90 TABLET | Refills: 0 | Status: SHIPPED | OUTPATIENT
Start: 2025-01-07 | End: 2025-02-06